# Patient Record
Sex: FEMALE | URBAN - METROPOLITAN AREA
[De-identification: names, ages, dates, MRNs, and addresses within clinical notes are randomized per-mention and may not be internally consistent; named-entity substitution may affect disease eponyms.]

---

## 2017-03-09 ENCOUNTER — APPOINTMENT (RX ONLY)
Dept: URBAN - METROPOLITAN AREA CLINIC 71 | Facility: CLINIC | Age: 32
Setting detail: DERMATOLOGY
End: 2017-03-09

## 2017-03-09 ENCOUNTER — APPOINTMENT (RX ONLY)
Dept: URBAN - METROPOLITAN AREA CLINIC 70 | Facility: CLINIC | Age: 32
Setting detail: DERMATOLOGY
End: 2017-03-09

## 2017-03-09 DIAGNOSIS — Z41.9 ENCOUNTER FOR PROCEDURE FOR PURPOSES OTHER THAN REMEDYING HEALTH STATE, UNSPECIFIED: ICD-10-CM

## 2017-03-09 PROCEDURE — ? FACIAL

## 2017-03-09 ASSESSMENT — LOCATION DETAILED DESCRIPTION DERM
LOCATION DETAILED: LEFT CENTRAL MALAR CHEEK
LOCATION DETAILED: LEFT CHIN
LOCATION DETAILED: RIGHT CENTRAL MALAR CHEEK
LOCATION DETAILED: LEFT CHIN
LOCATION DETAILED: INFERIOR MID FOREHEAD
LOCATION DETAILED: INFERIOR MID FOREHEAD
LOCATION DETAILED: RIGHT CENTRAL MALAR CHEEK
LOCATION DETAILED: LEFT CENTRAL MALAR CHEEK

## 2017-03-09 ASSESSMENT — LOCATION SIMPLE DESCRIPTION DERM
LOCATION SIMPLE: INFERIOR FOREHEAD
LOCATION SIMPLE: INFERIOR FOREHEAD
LOCATION SIMPLE: CHIN
LOCATION SIMPLE: LEFT CHEEK
LOCATION SIMPLE: RIGHT CHEEK
LOCATION SIMPLE: LEFT CHEEK
LOCATION SIMPLE: RIGHT CHEEK
LOCATION SIMPLE: CHIN

## 2017-03-09 ASSESSMENT — LOCATION ZONE DERM
LOCATION ZONE: FACE
LOCATION ZONE: FACE

## 2017-03-09 NOTE — PROCEDURE: FACIAL
Facial Steaming: steamed
Treatment Type (Optional): Deep Cleanse Treatment
Mask Type (Optional): antonina-based
Extraction Method: extractor
Comments (Sticky):  performed a deep cleansing pore facial.  cleansed the skin with simply clean, exfoliated with micro exfoliating scrub, skin was steamed using gentle cleanser + retext act, performed extractions, and hydrating mask. After treatment CE ferulic, emoillence, spf 50 and age eye were applied at the end of treatment.
Price (Use Numbers Only, No Special Characters Or $): 90
Exfoliation Type: exfoliant
Detail Level: Zone

## 2017-03-09 NOTE — PROCEDURE: FACIAL
Facial Steaming: steamed
Price (Use Numbers Only, No Special Characters Or $): 90
Extraction Method: extractor
Comments (Sticky):  performed a deep cleansing pore facial.  cleansed the skin with simply clean, exfoliated with micro exfoliating scrub, skin was steamed using gentle cleanser + retext act, performed extractions, and hydrating mask. After treatment CE ferulic, emoillence, spf 50 and age eye were applied at the end of treatment.
Treatment Type (Optional): Deep Cleanse Treatment
Exfoliation Type: exfoliant
Detail Level: Zone
Mask Type (Optional): leopoldo-based